# Patient Record
Sex: FEMALE | Employment: OTHER | URBAN - METROPOLITAN AREA
[De-identification: names, ages, dates, MRNs, and addresses within clinical notes are randomized per-mention and may not be internally consistent; named-entity substitution may affect disease eponyms.]

---

## 2020-01-06 ENCOUNTER — RECORDS - HEALTHEAST (OUTPATIENT)
Dept: LAB | Facility: CLINIC | Age: 70
End: 2020-01-06

## 2020-01-06 LAB
ANION GAP SERPL CALCULATED.3IONS-SCNC: 12 MMOL/L (ref 5–18)
BUN SERPL-MCNC: 21 MG/DL (ref 8–22)
CALCIUM SERPL-MCNC: 10.3 MG/DL (ref 8.5–10.5)
CHLORIDE BLD-SCNC: 100 MMOL/L (ref 98–107)
CHOLEST SERPL-MCNC: 210 MG/DL
CO2 SERPL-SCNC: 26 MMOL/L (ref 22–31)
CREAT SERPL-MCNC: 0.8 MG/DL (ref 0.6–1.1)
FASTING STATUS PATIENT QL REPORTED: YES
GFR SERPL CREATININE-BSD FRML MDRD: >60 ML/MIN/1.73M2
GLUCOSE BLD-MCNC: 122 MG/DL (ref 70–125)
HDLC SERPL-MCNC: 72 MG/DL
LDLC SERPL CALC-MCNC: 115 MG/DL
POTASSIUM BLD-SCNC: 4.5 MMOL/L (ref 3.5–5)
SODIUM SERPL-SCNC: 138 MMOL/L (ref 136–145)
TRIGL SERPL-MCNC: 113 MG/DL

## 2020-10-12 ENCOUNTER — RECORDS - HEALTHEAST (OUTPATIENT)
Dept: LAB | Facility: CLINIC | Age: 70
End: 2020-10-12

## 2020-10-12 LAB
ALBUMIN SERPL-MCNC: 3.8 G/DL (ref 3.5–5)
ALP SERPL-CCNC: 77 U/L (ref 45–120)
ALT SERPL W P-5'-P-CCNC: 21 U/L (ref 0–45)
ANION GAP SERPL CALCULATED.3IONS-SCNC: 12 MMOL/L (ref 5–18)
AST SERPL W P-5'-P-CCNC: 18 U/L (ref 0–40)
BILIRUB SERPL-MCNC: 0.4 MG/DL (ref 0–1)
BUN SERPL-MCNC: 17 MG/DL (ref 8–22)
CALCIUM SERPL-MCNC: 10.8 MG/DL (ref 8.5–10.5)
CHLORIDE BLD-SCNC: 100 MMOL/L (ref 98–107)
CO2 SERPL-SCNC: 28 MMOL/L (ref 22–31)
CREAT SERPL-MCNC: 0.79 MG/DL (ref 0.6–1.1)
GFR SERPL CREATININE-BSD FRML MDRD: >60 ML/MIN/1.73M2
GLUCOSE BLD-MCNC: 119 MG/DL (ref 70–125)
POTASSIUM BLD-SCNC: 4.1 MMOL/L (ref 3.5–5)
PROT SERPL-MCNC: 7.4 G/DL (ref 6–8)
SODIUM SERPL-SCNC: 140 MMOL/L (ref 136–145)

## 2020-10-13 ENCOUNTER — RECORDS - HEALTHEAST (OUTPATIENT)
Dept: LAB | Facility: CLINIC | Age: 70
End: 2020-10-13

## 2020-10-14 LAB
O+P STL MICRO: NORMAL
SHIGA TOXIN 1: NEGATIVE
SHIGA TOXIN 2: NEGATIVE
WBC STL QL MICRO: ABNORMAL

## 2020-10-15 LAB
GLIADIN IGA SER-ACNC: 1.8 U/ML
GLIADIN IGG SER-ACNC: <0.4 U/ML
IGA SERPL-MCNC: 1463 MG/DL (ref 65–400)
TTG IGA SER-ACNC: 2 U/ML
TTG IGG SER-ACNC: <0.6 U/ML

## 2020-10-16 LAB — BACTERIA SPEC CULT: NORMAL

## 2020-12-02 ENCOUNTER — RECORDS - HEALTHEAST (OUTPATIENT)
Dept: LAB | Facility: CLINIC | Age: 70
End: 2020-12-02

## 2020-12-02 LAB — POTASSIUM BLD-SCNC: 4.4 MMOL/L (ref 3.5–5)

## 2020-12-07 ENCOUNTER — RECORDS - HEALTHEAST (OUTPATIENT)
Dept: ADMINISTRATIVE | Facility: OTHER | Age: 70
End: 2020-12-07

## 2020-12-14 ENCOUNTER — RECORDS - HEALTHEAST (OUTPATIENT)
Dept: ADMINISTRATIVE | Facility: OTHER | Age: 70
End: 2020-12-14

## 2020-12-14 LAB
LAB AP CHARGES (HE HISTORICAL CONVERSION): NORMAL
PATH REPORT.COMMENTS IMP SPEC: NORMAL
PATH REPORT.FINAL DX SPEC: NORMAL
PATH REPORT.GROSS SPEC: NORMAL
PATH REPORT.MICROSCOPIC SPEC OTHER STN: NORMAL
PATH REPORT.RELEVANT HX SPEC: NORMAL
RESULT FLAG (HE HISTORICAL CONVERSION): NORMAL

## 2020-12-23 ENCOUNTER — RECORDS - HEALTHEAST (OUTPATIENT)
Dept: LAB | Facility: CLINIC | Age: 70
End: 2020-12-23

## 2020-12-23 LAB — TSH SERPL DL<=0.005 MIU/L-ACNC: 1.68 UIU/ML (ref 0.3–5)

## 2022-02-09 ENCOUNTER — LAB REQUISITION (OUTPATIENT)
Dept: LAB | Facility: CLINIC | Age: 72
End: 2022-02-09
Payer: COMMERCIAL

## 2022-02-09 DIAGNOSIS — E78.2 MIXED HYPERLIPIDEMIA: ICD-10-CM

## 2022-02-09 DIAGNOSIS — R73.03 PREDIABETES: ICD-10-CM

## 2022-02-09 LAB
ANION GAP SERPL CALCULATED.3IONS-SCNC: 10 MMOL/L (ref 5–18)
BUN SERPL-MCNC: 18 MG/DL (ref 8–22)
CALCIUM SERPL-MCNC: 10.3 MG/DL (ref 8.5–10.5)
CHLORIDE BLD-SCNC: 101 MMOL/L (ref 98–107)
CHOLEST SERPL-MCNC: 220 MG/DL
CO2 SERPL-SCNC: 29 MMOL/L (ref 22–31)
CREAT SERPL-MCNC: 0.81 MG/DL (ref 0.6–1.1)
GFR SERPL CREATININE-BSD FRML MDRD: 78 ML/MIN/1.73M2
GLUCOSE BLD-MCNC: 143 MG/DL (ref 70–125)
HDLC SERPL-MCNC: 53 MG/DL
LDLC SERPL CALC-MCNC: 135 MG/DL
POTASSIUM BLD-SCNC: 4.2 MMOL/L (ref 3.5–5)
SODIUM SERPL-SCNC: 140 MMOL/L (ref 136–145)
TRIGL SERPL-MCNC: 158 MG/DL

## 2022-02-09 PROCEDURE — 80048 BASIC METABOLIC PNL TOTAL CA: CPT | Performed by: FAMILY MEDICINE

## 2022-02-09 PROCEDURE — 80061 LIPID PANEL: CPT | Mod: ORL | Performed by: FAMILY MEDICINE

## 2023-04-13 NOTE — PROGRESS NOTES
Medication Therapy Management (MTM) Encounter    ASSESSMENT:                            Medication Adherence/Access: No issues identified    Type 2 Diabetes: Patient is not meeting A1c goal of < 7%. Needs further education on Ozempic injection and clarification on cost through pharmacy.    Chronic pain: Plan in place with ortho.    Tremor: Primidone has not been helping, plan in place for neurology follow-up    Afib/Hypertension: Stable.    Hyperlipidemia: Stable. Continue to monitor for improvement with anticipated weight loss and diet changes.      PLAN:                            1. We started you on the Ozempic 0.25mg today in clinic. Plan to take your weekly dose every Saturday with Lei. You will take 0.25mg once a week for 4 weeks, then increase to 0.5mg. There are TWO 0.5mg doses in your current pen. This medication helps your insulin to stay around longer to do its job, decreases appetite, and slows how you absorb sugar into your blood.  It is generally very effective in lowering blood sugar and helping with weight loss. There are some heart and kidney protective benefits long-term. Side effects we monitor for include nausea, vomiting, bloating, constipation or diarrhea, and abdominal pain.    2. When you are due to refill, we confirmed with [facility], that they will have the Ozempic 0.5mg available for you at $47/month.    Follow-up: Friday, May 12th at 10am    SUBJECTIVE/OBJECTIVE:                          Lesly Lazaro is a 73 year old female coming in for an initial visit. She was referred to me from Dr. Franks.      Reason for visit: Medication Review.    Allergies/ADRs: Tetanus, Procaine  Past Medical History: Reviewed in chart  Tobacco: She reports that she quit smoking about 2 years ago. Her smoking use included cigarettes. She does not have any smokeless tobacco history on file.  Alcohol: 1-3 beverages / week    Medication Adherence/Access: no issues reported. Has a chart at home to help her  stay compliant    Type 2 Diabetes:   Ozempic 0.25 mg injected once per week - PA was approved. We called pharmacy to see if she could get it filled today. They can fill it, however it was sent over for the old formulation of 2mg/1.5mL. Additionally, the pharmacist told us there was a cost issue with a 6 week supply of medication and that the pharmacy would be out about $80, which they would have to charge to [facility]. If we were able to send over a 30 day supply of medication, her copay would be $47 with no additional dispensing fees.    Medication Hx:  Metformin - severe GI upset, nausea    Blood Sugar Ranges: does not check  Symptoms of low blood sugar? none.   Symptoms of high blood sugar? None, always thirsty    Diet/Exercise: Admits diet hasn't been great since she quit smoking 2 years ago    Aspirin: Taking 81mg daily and denies side effects   Statin: Atorvastatin 80 mg daily  ACEi/ARB: Losartan 25 mg daily  A1c (4/7/2023): 7.0%, Urine Albumin: None on file, new diagnosis    Chronic pain:   Following with ortho for ruptured disks and hx of arthritis. Had an injection last week but it hasn't helped yet.  Gabapentin 300mg three times daily - has been on since about January and hasn't noticed much effect. She is scheduled for a follow-up next week    Tremor:   Primidone - hasn't helped all that much. Is following with neuro and has upcoming follow-up.     Afib/Hypertension:   warfarin 7.5mg per PCP for anticoagulation  Metoprolol succinate 50mg daily  hydrochlorothiazide 25mg daily  Losartan 25mg daily  Patient reports  no current concerns of bruising or bleeding, no other medication side effects  Patient does not self-monitor blood pressure  Last OV BP: 128/78mmHg    Hyperlipidemia:   Atorvastatin 80mg daily  Patient reports no significant myalgias or other side effects.  Recent Labs   Lab Test 02/09/22 01/06/20   CHOL 220* 210*   HDL 53 72   * 115   TRIG 158* 113       Today's Vitals: Wt 187 lb 9.6 oz (85.1  kg)   ----------------      I spent 60 minutes with this patient today. All changes were made via collaborative practice agreement with No primary care provider on file.. A copy of the visit note was provided to the patient's provider(s).    A summary of these recommendations was given to the patient.    Norah Olsen, Pharm.D., [facility]  Medication Therapy Management Pharmacist  [phone]     Medication Therapy Recommendations  Type 2 diabetes mellitus without complication, without long-term current use of insulin (H)    Current Medication: semaglutide (OZEMPIC) 2 MG/3ML pen   Rationale: Untreated condition - Needs additional medication therapy - Indication   Recommendation: Start Medication   Status: Accepted per CPA

## 2023-04-14 ENCOUNTER — OFFICE VISIT (OUTPATIENT)
Dept: PHARMACY | Facility: PHYSICIAN GROUP | Age: 73
End: 2023-04-14
Payer: COMMERCIAL

## 2023-04-14 VITALS — WEIGHT: 187.6 LBS

## 2023-04-14 DIAGNOSIS — E11.9 TYPE 2 DIABETES MELLITUS WITHOUT COMPLICATION, WITHOUT LONG-TERM CURRENT USE OF INSULIN (H): Primary | ICD-10-CM

## 2023-04-14 DIAGNOSIS — I10 HYPERTENSION, ESSENTIAL: ICD-10-CM

## 2023-04-14 DIAGNOSIS — I48.91 ATRIAL FIBRILLATION, UNSPECIFIED TYPE (H): ICD-10-CM

## 2023-04-14 DIAGNOSIS — E78.5 HYPERLIPIDEMIA, UNSPECIFIED HYPERLIPIDEMIA TYPE: ICD-10-CM

## 2023-04-14 DIAGNOSIS — G25.0 ESSENTIAL TREMOR: ICD-10-CM

## 2023-04-14 DIAGNOSIS — R52 PAIN: ICD-10-CM

## 2023-04-14 PROCEDURE — 99605 MTMS BY PHARM NP 15 MIN: CPT | Performed by: PHARMACIST

## 2023-04-14 PROCEDURE — 99607 MTMS BY PHARM ADDL 15 MIN: CPT | Performed by: PHARMACIST

## 2023-04-14 RX ORDER — METOPROLOL SUCCINATE 50 MG/1
50 TABLET, EXTENDED RELEASE ORAL DAILY
COMMUNITY

## 2023-04-14 RX ORDER — LOSARTAN POTASSIUM 25 MG/1
25 TABLET ORAL DAILY
COMMUNITY

## 2023-04-14 RX ORDER — ATORVASTATIN CALCIUM 80 MG/1
80 TABLET, FILM COATED ORAL DAILY
COMMUNITY

## 2023-04-14 RX ORDER — IPRATROPIUM BROMIDE AND ALBUTEROL SULFATE 2.5; .5 MG/3ML; MG/3ML
1 SOLUTION RESPIRATORY (INHALATION) EVERY 6 HOURS PRN
COMMUNITY

## 2023-04-14 RX ORDER — PRIMIDONE 50 MG/1
50 TABLET ORAL 2 TIMES DAILY
COMMUNITY

## 2023-04-14 RX ORDER — WARFARIN SODIUM 7.5 MG/1
7.5 TABLET ORAL
COMMUNITY

## 2023-04-14 RX ORDER — ACETAMINOPHEN 325 MG/1
2 TABLET ORAL EVERY 4 HOURS PRN
COMMUNITY

## 2023-04-14 RX ORDER — GABAPENTIN 300 MG/1
300 CAPSULE ORAL 3 TIMES DAILY
COMMUNITY
End: 2024-01-26

## 2023-04-14 RX ORDER — HYDROCHLOROTHIAZIDE 25 MG/1
25 TABLET ORAL DAILY
COMMUNITY

## 2023-04-14 RX ORDER — LORAZEPAM 0.5 MG/1
0.5 TABLET ORAL 3 TIMES DAILY PRN
COMMUNITY

## 2023-04-14 RX ORDER — VENLAFAXINE HYDROCHLORIDE 150 MG/1
150 CAPSULE, EXTENDED RELEASE ORAL DAILY
COMMUNITY
End: 2023-07-12 | Stop reason: DRUGHIGH

## 2023-04-14 RX ORDER — ASPIRIN 81 MG/1
81 TABLET ORAL DAILY
COMMUNITY

## 2023-04-14 NOTE — LETTER
_  Medication List        Prepared on: Apr 14, 2023     Bring your Medication List when you go to the doctor, hospital, or   emergency room. And, share it with your family or caregivers.     Note any changes to how you take your medications.  Cross out medications when you no longer use them.    Medication How I take it Why I use it Prescriber   acetaminophen (TYLENOL) 325 MG tablet Take 2 tablets by mouth every 4 hours as needed  Pain Dell Franks MD   ALBUTEROL SULFATE HFA IN Inhale 2 Inhalations into the lungs every 4 hours as needed COPD Dell Franks MD   aspirin 81 MG EC tablet Take 81 mg by mouth daily Disease involving Lipid Deposits in the Arteries Dell Franks MD   atorvastatin (LIPITOR) 80 MG tablet Take 80 mg by mouth daily Elevation of Both Cholesterol and Triglycerides in Blood Dell Franks MD   gabapentin (NEURONTIN) 300 MG capsule Take 300 mg by mouth 3 times daily Neuropathic Pain Dell Franks MD   hydrochlorothiazide (HYDRODIURIL) 25 MG tablet Take 25 mg by mouth daily High Blood Pressure Disorder Dell Franks MD   ipratropium - albuterol 0.5 mg/2.5 mg/3 mL (DUONEB) 0.5-2.5 (3) MG/3ML neb solution Take 1 vial by nebulization every 6 hours as needed for shortness of breath COPD Dell Franks MD   LORazepam (ATIVAN) 0.5 MG tablet Take 0.5 mg by mouth 3 times daily as needed for anxiety Anxiety Dell Franks MD   losartan (COZAAR) 25 MG tablet Take 25 mg by mouth daily High Blood Pressure Disorder Dell Franks MD   metoprolol succinate ER (TOPROL XL) 50 MG 24 hr tablet Take 50 mg by mouth daily Atrial Fibrillation; High Blood Pressure Disorder Dell Franks MD   primidone (MYSOLINE) 50 MG tablet Take 100 mg by mouth daily Fine to Coarse Slow Tremor Affecting Head, Hands & Voice Dell Franks MD   semaglutide (OZEMPIC) 2 MG/3ML pen Inject 0.25 mg Subcutaneous every 7 days Type 2 Diabetes Dell Franks MD   venlafaxine (EFFEXOR XR) 150 MG 24 hr capsule Take 150  mg by mouth daily Major Depressive Disorder Dell Franks MD   warfarin ANTICOAGULANT (COUMADIN) 7.5 MG tablet Take 7.5 mg by mouth As directed per PCP Atrial Fibrillation Dell Franks MD         Add new medications, over-the-counter drugs, herbals, vitamins, or  minerals in the blank rows below.    Medication How I take it Why I use it Prescriber                                      Allergies:      No Active Allergies        Side effects I have had:               Other Information:              My notes and questions:

## 2023-04-14 NOTE — LETTER
April 14, 2023  Lesly Lazaro  [address] [place] [address]    Dear Ms. Lazaro, [facility]     Thank you for talking with me on Apr 14, 2023 about your health and medications. As a follow-up to our conversation, I have included two documents:      1. Your Recommended To-Do List has steps you should take to get the best results from your medications.  2. Your Medication List will help you keep track of your medications and how to take them.    If you want to talk about these documents, please call Norah Olsen RPH, PharmD, BCACP  at phone: [phone], Monday-Friday 8-4:30pm.    I look forward to working with you and your doctors to make sure your medications work well for you.    Sincerely,  Norah Olsen RPH, PharmD, BCACP   [facility] Pharmacist, [facility]

## 2023-04-14 NOTE — LETTER
"Recommended To-Do List      Prepared on: Apr 14, 2023       You can get the best results from your medications by completing the items on this \"To-Do List.\"      Bring your To-Do List when you go to your doctor. And, share it with your family or caregivers.    My To-Do List:  What we talked about: What I should do:   A new medication that may be of benefit to you    Start taking Ozempic 0.25mg injected once weekly                "

## 2023-05-11 NOTE — PROGRESS NOTES
Medication Therapy Management (MTM) Encounter    ASSESSMENT:                            Medication Adherence/Access: No issues identified    Type 2 Diabetes: Patient is not meeting A1c goal of < 7%.  She would benefit from increase in Ozempic dose for further blood sugar lowering, weight loss, cardiorenal benefits.        PLAN:                            1.  Starting with your shot next week (May 20th), increase Ozempic to 0.5mg. You will want to call the pharmacy and request a fill of Ozempic. Continue taking 0.5mg injected every Saturday. We'll plan to have you on 0.5mg for about 5 weeks.      Follow-up:  Future Appointments 5/12/2023 - 11/8/2023      Date Visit Type Length Department Provider     6/16/2023 10:30 AM RETURN - MT 30 min ETR  [place] [facility]  EXT Norah Olsen RPH                     SUBJECTIVE/OBJECTIVE:                          Lesly Lazaro is a 73 year old female coming in for an follow-up visit from 4/14/2023. She was referred to me from Dr. Franks.      Reason for visit: Ozempic titration    Allergies/ADRs: Tetanus, Procaine  Past Medical History: Reviewed in chart  Tobacco: She reports that she quit smoking about 2 years ago. Her smoking use included cigarettes. She does not have any smokeless tobacco history on file.  Alcohol: 1-3 beverages / week    Medication Adherence/Access: no issues reported. Has a chart at home to help her stay compliant    Type 2 Diabetes:   Ozempic 0.25 mg injected once per week - tolerating without side effects, notices a slight appetite reduction. Took her 5th shot of 0.25mg this morning, usually takes on Saturdays but going out of town this weekend.    Medication Hx:  Metformin - severe GI upset, nausea    Blood Sugar Ranges: does not check  Symptoms of low blood sugar? none.   Symptoms of high blood sugar? None reported    Diet/Exercise: Admits diet hasn't been great since she quit smoking 2 years ago    Aspirin: Taking 81mg daily and denies side effects    Statin: Atorvastatin 80 mg daily  ACEi/ARB: Losartan 25 mg daily  A1c (4/7/2023): 7.0%, Urine Albumin: None on file, new diagnosis        Today's Vitals: /68   Pulse 80   Wt 183 lb 12.8 oz (83.4 kg)   ----------------      I spent 60 minutes with this patient today. All changes were made via collaborative practice agreement with No primary care provider on file.. A copy of the visit note was provided to the patient's provider(s).    A summary of these recommendations was given to the patient.    Norah Olsen, Pharm.D., [facility]  Medication Therapy Management Pharmacist  [phone]     Medication Therapy Recommendations  Type 2 diabetes mellitus without complication, without long-term current use of insulin (H)    Current Medication: semaglutide (OZEMPIC) 2 MG/3ML pen   Rationale: Dose too low - Dosage too low - Effectiveness   Recommendation: Increase Dose   Status: Accepted per CPA

## 2023-05-12 ENCOUNTER — OFFICE VISIT (OUTPATIENT)
Dept: PHARMACY | Facility: PHYSICIAN GROUP | Age: 73
End: 2023-05-12
Payer: COMMERCIAL

## 2023-05-12 VITALS — SYSTOLIC BLOOD PRESSURE: 108 MMHG | HEART RATE: 80 BPM | WEIGHT: 183.8 LBS | DIASTOLIC BLOOD PRESSURE: 68 MMHG

## 2023-05-12 DIAGNOSIS — E11.9 TYPE 2 DIABETES MELLITUS WITHOUT COMPLICATION, WITHOUT LONG-TERM CURRENT USE OF INSULIN (H): Primary | ICD-10-CM

## 2023-05-12 PROCEDURE — 99607 MTMS BY PHARM ADDL 15 MIN: CPT | Performed by: PHARMACIST

## 2023-05-12 PROCEDURE — 99606 MTMS BY PHARM EST 15 MIN: CPT | Performed by: PHARMACIST

## 2023-05-12 NOTE — PATIENT INSTRUCTIONS
Recommendations from today's MTM visit:                                                      1.  Starting with your shot next week (May 20th), increase Ozempic to 0.5mg. You will want to call the pharmacy and request a fill of Ozempic. Continue taking 0.5mg injected every Saturday. We'll plan to have you on 0.5mg for about 5 weeks.      Follow-up:  Future Appointments 5/12/2023 - 11/8/2023        Date Visit Type Length Department Provider     6/16/2023 10:30 AM RETURN - MTM 30 min ETR  [facility]  EXT [place], Norah Bloom, [facility]                       It was great to speak with you today.  I value your experience and would be very thankful for your time with providing feedback on our clinic survey. You may receive a survey via email or text message in the next few days.     To schedule another MTM appointment, please call the clinic directly or you may call the MTM scheduling line at [phone] or toll-free at [phone].     My Clinical Pharmacist's contact information:                                                      Please feel free to contact me with any questions or concerns you have.      Norah Olsen, Pharm.D., BCACP  Medication Therapy Management Pharmacist

## 2023-06-15 NOTE — PROGRESS NOTES
Medication Therapy Management (MTM) Encounter    ASSESSMENT:                            Medication Adherence/Access: No issues identified    Type 2 Diabetes: Patient is not meeting A1c goal of < 7%, due for recheck next month.  She would benefit from increase in Ozempic dose for further blood sugar lowering, weight loss, cardiorenal benefits.      Chronic pain: Not well controlled, plan in place with ortho.      PLAN:                            1.  Increase your Ozempic to 1mg injected once per week, when/if your current supply of 0.5mg is gone.    2. At our next visit, we'll have you get a blood draw for the A1c and urine sample for the kidneys.      Follow-up:  Future Appointments 6/16/2023 - 12/13/2023      Date Visit Type Length Department Provider     7/12/2023  9:00 AM RETURN - MTM 30 min ETR  [place]  EXT Norah Olsen, [facility]                     SUBJECTIVE/OBJECTIVE:                          Lesly Lazaro is a 73 year old female coming in for an follow-up visit from 5/12/2023. She was referred to me from Dr. Franks.      Reason for visit: Ozempic titration    Allergies/ADRs: Tetanus, Procaine  Past Medical History: Reviewed in chart  Tobacco: She reports that she quit smoking about 2 years ago. Her smoking use included cigarettes. She does not have any smokeless tobacco history on file.  Alcohol: 1-3 beverages / week    Medication Adherence/Access: no issues reported. Has a chart at home to help her stay compliant    Type 2 Diabetes:   Ozempic 0.5 mg injected once per week - increased last visit, tolerating without side effects, notices a appetite reduction.  Medication Hx:  Metformin - severe GI upset, nausea    Blood Sugar Ranges: does not check  Symptoms of low blood sugar? none.   Symptoms of high blood sugar? None reported    Diet/Exercise: Admits diet hasn't been great since she quit smoking 2 years ago  Aspirin: Taking 81mg daily and denies side effects   Statin: Atorvastatin 80 mg  daily  ACEi/ARB: Losartan 25 mg daily  A1c (4/7/2023): 7.0%, Urine Albumin: None on file, new diagnosis    Chronic pain:   Following with ortho for ruptured disks and hx of arthritis. Has had several injections over the last few months, which haven't helped  Gabapentin 300mg three times daily - increased to four times daily at most recent ortho visit, but it hasn't helped  She has upcoming follow-up with surgeon to evaluate for surgical treatment.has been on since about January and hasn't noticed much effect. She is scheduled for a follow-up next week    Today's Vitals: /78   Wt 179 lb (81.2 kg)   ----------------      I spent 30 minutes with this patient today. All changes were made via collaborative practice agreement with No primary care provider on file.. A copy of the visit note was provided to the patient's provider(s).    A summary of these recommendations was given to the patient.    Norah Olsen, Pharm.D., [facility]  Medication Therapy Management Pharmacist  [phone]     Medication Therapy Recommendations  Type 2 diabetes mellitus without complication, without long-term current use of insulin (H)    Current Medication: semaglutide (OZEMPIC) 2 MG/3ML pen (Discontinued)   Rationale: Dose too low - Dosage too low - Effectiveness   Recommendation: Increase Dose - Ozempic (1 MG/DOSE) 4 MG/3ML Sopn   Status: Accepted per CPA

## 2023-06-16 ENCOUNTER — OFFICE VISIT (OUTPATIENT)
Dept: PHARMACY | Facility: PHYSICIAN GROUP | Age: 73
End: 2023-06-16
Payer: COMMERCIAL

## 2023-06-16 VITALS — WEIGHT: 179 LBS | DIASTOLIC BLOOD PRESSURE: 78 MMHG | SYSTOLIC BLOOD PRESSURE: 108 MMHG

## 2023-06-16 DIAGNOSIS — R52 PAIN: ICD-10-CM

## 2023-06-16 DIAGNOSIS — E11.9 TYPE 2 DIABETES MELLITUS WITHOUT COMPLICATION, WITHOUT LONG-TERM CURRENT USE OF INSULIN (H): Primary | ICD-10-CM

## 2023-06-16 PROCEDURE — 99606 MTMS BY PHARM EST 15 MIN: CPT | Performed by: PHARMACIST

## 2023-06-16 PROCEDURE — 99607 MTMS BY PHARM ADDL 15 MIN: CPT | Performed by: PHARMACIST

## 2023-06-16 RX ORDER — SEMAGLUTIDE 1.34 MG/ML
1 INJECTION, SOLUTION SUBCUTANEOUS
COMMUNITY
End: 2023-07-12 | Stop reason: DRUGHIGH

## 2023-06-16 NOTE — PATIENT INSTRUCTIONS
Recommendations from today's MTM visit:                                                        1.  Increase your Ozempic to 1mg injected once per week, when/if your current supply of 0.5mg is gone.    2. At our next visit, we'll have you get a blood draw for the A1c and urine sample for the kidneys.      Follow-up:  Future Appointments 6/16/2023 - 12/13/2023        Date Visit Type Length Department Provider     7/12/2023  9:00 AM RETURN - [facility] 30 min ETR  [facility]  EXT Norah Olsen, [facility]                     It was great to speak with you today.  I value your experience and would be very thankful for your time with providing feedback on our clinic survey. You may receive a survey via email or text message in the next few days.     To schedule another MTM appointment, please call the clinic directly or you may call the MTM scheduling line at [phone]    My Clinical Pharmacist's contact information:                                                      Please feel free to contact me with any questions or concerns you have.      Norah Olsen, Pharm.D., BCACP  Medication Therapy Management Pharmacist

## 2023-07-12 ENCOUNTER — TRANSFERRED RECORDS (OUTPATIENT)
Dept: HEALTH INFORMATION MANAGEMENT | Facility: CLINIC | Age: 73
End: 2023-07-12

## 2023-07-12 ENCOUNTER — LAB REQUISITION (OUTPATIENT)
Dept: LAB | Facility: CLINIC | Age: 73
End: 2023-07-12
Payer: COMMERCIAL

## 2023-07-12 ENCOUNTER — OFFICE VISIT (OUTPATIENT)
Dept: PHARMACY | Facility: PHYSICIAN GROUP | Age: 73
End: 2023-07-12
Payer: COMMERCIAL

## 2023-07-12 VITALS — WEIGHT: 176.2 LBS

## 2023-07-12 DIAGNOSIS — E11.9 TYPE 2 DIABETES MELLITUS WITHOUT COMPLICATIONS (H): ICD-10-CM

## 2023-07-12 DIAGNOSIS — F41.9 ANXIETY: ICD-10-CM

## 2023-07-12 DIAGNOSIS — G25.0 ESSENTIAL TREMOR: ICD-10-CM

## 2023-07-12 DIAGNOSIS — E11.9 TYPE 2 DIABETES MELLITUS WITHOUT COMPLICATION, WITHOUT LONG-TERM CURRENT USE OF INSULIN (H): Primary | ICD-10-CM

## 2023-07-12 DIAGNOSIS — E78.2 MIXED HYPERLIPIDEMIA: ICD-10-CM

## 2023-07-12 DIAGNOSIS — F32.A DEPRESSION, UNSPECIFIED DEPRESSION TYPE: ICD-10-CM

## 2023-07-12 LAB
ANION GAP SERPL CALCULATED.3IONS-SCNC: 13 MMOL/L (ref 7–15)
BUN SERPL-MCNC: 14.6 MG/DL (ref 8–23)
CALCIUM SERPL-MCNC: 10.1 MG/DL (ref 8.8–10.2)
CHLORIDE SERPL-SCNC: 99 MMOL/L (ref 98–107)
CHOLEST SERPL-MCNC: 193 MG/DL
CREAT SERPL-MCNC: 0.95 MG/DL (ref 0.51–0.95)
CREAT UR-MCNC: 371 MG/DL
DEPRECATED HCO3 PLAS-SCNC: 29 MMOL/L (ref 22–29)
GFR SERPL CREATININE-BSD FRML MDRD: 64 ML/MIN/1.73M2
GLUCOSE SERPL-MCNC: 110 MG/DL (ref 70–99)
HBA1C MFR BLD: 6.1 % (ref 4.2–6.1)
HDLC SERPL-MCNC: 64 MG/DL
LDLC SERPL CALC-MCNC: 107 MG/DL
MICROALBUMIN UR-MCNC: 19.4 MG/L
MICROALBUMIN/CREAT UR: 5.23 MG/G CR (ref 0–25)
NONHDLC SERPL-MCNC: 129 MG/DL
POTASSIUM SERPL-SCNC: 4.2 MMOL/L (ref 3.4–5.3)
SODIUM SERPL-SCNC: 141 MMOL/L (ref 136–145)
TRIGL SERPL-MCNC: 112 MG/DL

## 2023-07-12 PROCEDURE — 82570 ASSAY OF URINE CREATININE: CPT | Mod: ORL | Performed by: FAMILY MEDICINE

## 2023-07-12 PROCEDURE — 99607 MTMS BY PHARM ADDL 15 MIN: CPT | Performed by: PHARMACIST

## 2023-07-12 PROCEDURE — 80048 BASIC METABOLIC PNL TOTAL CA: CPT | Mod: ORL | Performed by: FAMILY MEDICINE

## 2023-07-12 PROCEDURE — 99606 MTMS BY PHARM EST 15 MIN: CPT | Performed by: PHARMACIST

## 2023-07-12 PROCEDURE — 80061 LIPID PANEL: CPT | Mod: ORL | Performed by: FAMILY MEDICINE

## 2023-07-12 RX ORDER — VENLAFAXINE HYDROCHLORIDE 37.5 MG/1
37.5 CAPSULE, EXTENDED RELEASE ORAL DAILY
COMMUNITY
End: 2023-11-08

## 2023-07-12 RX ORDER — VENLAFAXINE HYDROCHLORIDE 75 MG/1
75 CAPSULE, EXTENDED RELEASE ORAL DAILY
COMMUNITY
End: 2023-08-09

## 2023-07-12 RX ORDER — VENLAFAXINE 75 MG/1
75 TABLET ORAL DAILY
COMMUNITY
End: 2023-07-12

## 2023-07-12 NOTE — PROGRESS NOTES
Medication Therapy Management (MTM) Encounter    ASSESSMENT:                            Medication Adherence/Access: No issues identified    Type 2 Diabetes: Patient is meeting A1c goal of < 7% as of recheck today!  She would benefit from increase in Ozempic dose for further blood sugar lowering, weight loss, cardiorenal benefits.      Tremor: Plan in place with neurology.  May also see reduction in tremor with discontinuation of venlafaxine.    Depression/anxiety: Stable.  She may benefit from tapering off of venlafaxine as treatment may no longer be indicated.      PLAN:                            1.  Increase your Ozempic to 2mg injected once per week. This is the maximum dose to help optimize weight loss and long term heart and kidney protection. A1c is improved form 7.0% to 6.1%!!    2. We are going to wean off of venlafaxine over the next month. Reduce to 75mg once daily for two weeks, then 37.5mg once daily for two weeks, then stop altogether. I sent prescriptions for the lower doses to the pharmacy. Pay attention to mood and anxiety levels after you have been off of venlafaxine for a few weeks/months and also note if tremor improves.    Follow-up:  Aug 09, 2023 10:30 AM  Pharmacist Visit with Norah Olsen [facility] ([facility] ) [phone]          SUBJECTIVE/OBJECTIVE:                          Lesly Lazaro is a 73 year old female coming in for an follow-up visit from 5/12/2023. She was referred to me from Dr. Franks.      Reason for visit: Ozempic titration. Asks about getting off of venlafaxine    Allergies/ADRs: Tetanus, Procaine  Past Medical History: Reviewed in chart  Tobacco: She reports that she quit smoking about 2 years ago. Her smoking use included cigarettes. She does not have any smokeless tobacco history on file.  Alcohol: 1-3 beverages / week    Medication Adherence/Access: no issues reported. Has a chart at home to  help her stay compliant    Type 2 Diabetes:   Ozempic 1 mg injected once per week - increased last visit, tolerating without side effects, notices appetite reduction.  Medication Hx:  Metformin - severe GI upset, nausea    Blood Sugar Ranges: does not check  Symptoms of low blood sugar? none.   Symptoms of high blood sugar? None reported  Diet/Exercise: Admits diet hasn't been great since she quit smoking 2 years ago  Urine Albumin: labs pending from today  A1c   (4/7/2023): 7.0%  (7/12/23): 6.1%    Tremor:   Primidone 50mg 2 tablets BID - dose increased from 1 tab twice daily at recent neurology visit 6/30/23. Hasn't started this dose yet, was waiting to hear if liver function labs were normal.      Depression/Anxiety:  Venlafaxine XR 150mg once daily  Lorazepam 0.5mg prn - takes one tablet every few months prior to travel - air or driving to a new place.   Patient is experiencing the following side effects: tremor, being treated as above but also concerned that this could be a side effect from venlafaxine  Patient reports that depression symptoms are stable and have been for some time.  Reports she started on antidepressants in 1996 during a divorce.  Now questions if she needs any medication for depression or anxiety, as she is in a good place with strong family support.      Today's Vitals: Wt 176 lb 3.2 oz (79.9 kg)   ----------------      I spent 30 minutes with this patient today. All changes were made via collaborative practice agreement with No primary care provider on file.. A copy of the visit note was provided to the patient's provider(s).    A summary of these recommendations was given to the patient.    Norah Olsen, Pharm.D., [facility]  Medication Therapy Management Pharmacist  [phone]     Medication Therapy Recommendations  Depression, unspecified depression type    Current Medication: venlafaxine (EFFEXOR XR) 150 MG 24 hr capsule (Discontinued)   Rationale: No medical indication at this  time - Unnecessary medication therapy - Indication   Recommendation: Discontinue Medication - venlafaxine 75 MG 24 hr capsule   Status: Accepted per CPA         Type 2 diabetes mellitus without complication, without long-term current use of insulin (H)    Current Medication: Semaglutide, 1 MG/DOSE, (OZEMPIC, 1 MG/DOSE,) 4 MG/3ML pen (Discontinued)   Rationale: Dose too low - Dosage too low - Effectiveness   Recommendation: Increase Dose - Ozempic (2 MG/DOSE) 8 MG/3ML Sopn   Status: Accepted per CPA

## 2023-07-12 NOTE — PATIENT INSTRUCTIONS
Recommendations from today's MTM visit:                                                      1.  Increase your Ozempic to 2mg injected once per week. This is the maximum dose to help optimize weight loss and long term heart and kidney protection. A1c is improved form 7.0% to 6.1%!!    2. We are going to wean off of venlafaxine over the next month. Reduce to 75mg once daily for two weeks, then 37.5mg once daily for two weeks, then stop altogether. I sent prescriptions for the lower doses to the pharmacy. Pay attention to mood and anxiety levels after you have been off of venlafaxine for a few weeks/months and also note if tremor improves.    Follow-up:  Aug 09, 2023 10:30 AM  Pharmacist Visit with Norah Olsen [facility] ([facility] ) [phone]            It was great to speak with you today.  I value your experience and would be very thankful for your time with providing feedback on our clinic survey. You may receive a survey via email or text message in the next few days.     To schedule another MTM appointment, please call the clinic directly or you may call the MTM scheduling line at [phone]    My Clinical Pharmacist's contact information:                                                      Please feel free to contact me with any questions or concerns you have.      Norah Olsen, Pharm.D., [facility]  Medication Therapy Management Pharmacist

## 2023-08-08 NOTE — PROGRESS NOTES
Medication Therapy Management (MTM) Encounter    ASSESSMENT:                            Medication Adherence/Access: No issues identified    Type 2 Diabetes: Patient is meeting A1c goal of < 7%.    Tremor: Plan in place with neurology.  May also see reduction in tremor with discontinuation of venlafaxine.    Depression/anxiety: Stable. Tolerating venlafaxine wean.       PLAN:                            1.  No changes to medications today.     2. We are going to see if the tremor improves off of the venlafaxine.    3. You may need to check around to different pharmacies to see who has a supply of Ozempic 2mg pens. I don't want you to go off of this completely if there are issues getting your prescription filled. Keep me posted.    Follow-up:  Nov 08, 2023 10:00 AM  Pharmacist Visit with Norah Olsen [facility] ([facility] ) [phone]     Norah Olsen, Pharm.D., [facility]  Medication Therapy Management Pharmacist  [phone]        SUBJECTIVE/OBJECTIVE:                          Lesly Lazaro is a 73 year old female coming in for an follow-up visit from 7/12/2023. She was referred to me from Dr. Franks.      Reason for visit: Ozempic titration. Asks about getting off of venlafaxine    Allergies/ADRs: Tetanus, Procaine  Past Medical History: Reviewed in chart  Tobacco: She reports that she quit smoking about 2 years ago. Her smoking use included cigarettes. She does not have any smokeless tobacco history on file.  Alcohol: 1-3 beverages / week    Medication Adherence/Access: no issues reported. Has a chart at home to help her stay compliant    Type 2 Diabetes:   Ozempic 2mg injected once per week - increased last visit, tolerating without side effects, notices appetite reduction.  Medication Hx:  Metformin - severe GI upset, nausea    Blood Sugar Ranges: does not check  Symptoms of low blood sugar? none.   Symptoms of high blood sugar?  None reported  Diet/Exercise: Admits diet hasn't been great since she quit smoking 2 years ago  Urine Albumin:   Lab Results   Component Value Date    UMALCR 5.23 07/12/2023            Tremor:   Primidone 50mg 2 tablets BID - dose increased from 1 tab twice daily at recent neurology visit 6/30/23. Hasn't started this dose yet; tells me today she is going to wait to increase from 2 tablets daily until fully off of venlafaxine.      Depression/Anxiety:  Venlafaxine XR 37.5 once daily - has 9 days left at home, then will be off; has been weaning off since last visit d/t feeling depression and anxiety symptoms were under good control  Lorazepam 0.5mg prn - takes one tablet every few months prior to travel - air or driving to a new place.   Patient is experiencing the following side effects: tremor, being treated as above but also concerned that this could be a side effect from venlafaxine - hasn't noticed a change to tremor at this point  Patient reports that depression symptoms are stable and have been for some time.  Reports she started on antidepressants in 1996 during a divorce.  Now questions if she needs any medication for depression or anxiety, as she is in a good place with strong family support.      Today's Vitals: Wt 171 lb 12.8 oz (77.9 kg)   ----------------      I spent 30 minutes with this patient today. All changes were made via collaborative practice agreement with No primary care provider on file.. A copy of the visit note was provided to the patient's provider(s).    A summary of these recommendations was given to the patient.    Norah Olsen, Pharm.D., [facility]  Medication Therapy Management Pharmacist  [phone]     Medication Therapy Recommendations  No medication therapy recommendations to display

## 2023-08-09 ENCOUNTER — OFFICE VISIT (OUTPATIENT)
Dept: PHARMACY | Facility: PHYSICIAN GROUP | Age: 73
End: 2023-08-09
Payer: COMMERCIAL

## 2023-08-09 VITALS — WEIGHT: 171.8 LBS

## 2023-08-09 DIAGNOSIS — F41.9 ANXIETY: ICD-10-CM

## 2023-08-09 DIAGNOSIS — F32.A DEPRESSION, UNSPECIFIED DEPRESSION TYPE: ICD-10-CM

## 2023-08-09 DIAGNOSIS — E11.9 TYPE 2 DIABETES MELLITUS WITHOUT COMPLICATION, WITHOUT LONG-TERM CURRENT USE OF INSULIN (H): Primary | ICD-10-CM

## 2023-08-09 DIAGNOSIS — G25.0 ESSENTIAL TREMOR: ICD-10-CM

## 2023-08-09 PROCEDURE — 99607 MTMS BY PHARM ADDL 15 MIN: CPT | Performed by: PHARMACIST

## 2023-08-09 PROCEDURE — 99606 MTMS BY PHARM EST 15 MIN: CPT | Performed by: PHARMACIST

## 2023-08-09 NOTE — PATIENT INSTRUCTIONS
Recommendations from today's MTM visit:                                                      1.  No changes to medications today.     2. We are going to see if the tremor improves off of the venlafaxine.    3. You may need to check around to different pharmacies to see who has a supply of Ozempic 2mg pens. I don't want you to go off of this completely if there are issues getting your prescription filled. Keep me posted.    Follow-up:  Nov 08, 2023 10:00 AM  Pharmacist Visit with Norah Olsen [facility] ([facility] ) [phone]       It was great to speak with you today.  I value your experience and would be very thankful for your time with providing feedback on our clinic survey. You may receive a survey via email or text message in the next few days.     To schedule another MT appointment, please call the clinic directly or you may call the MTM scheduling line at [phone]    My Clinical Pharmacist's contact information:                                                      Please feel free to contact me with any questions or concerns you have.      Norah Olsen, Pharm.D., BCACP  Medication Therapy Management Pharmacist

## 2023-08-11 ENCOUNTER — TRANSFERRED RECORDS (OUTPATIENT)
Dept: HEALTH INFORMATION MANAGEMENT | Facility: CLINIC | Age: 73
End: 2023-08-11
Payer: COMMERCIAL

## 2023-09-29 ENCOUNTER — LAB REQUISITION (OUTPATIENT)
Dept: LAB | Facility: CLINIC | Age: 73
End: 2023-09-29
Payer: COMMERCIAL

## 2023-09-29 DIAGNOSIS — E04.1 NONTOXIC SINGLE THYROID NODULE: ICD-10-CM

## 2023-09-29 LAB — TSH SERPL DL<=0.005 MIU/L-ACNC: 1.01 UIU/ML (ref 0.3–4.2)

## 2023-09-29 PROCEDURE — 86376 MICROSOMAL ANTIBODY EACH: CPT | Mod: ORL | Performed by: FAMILY MEDICINE

## 2023-09-29 PROCEDURE — 84443 ASSAY THYROID STIM HORMONE: CPT | Mod: ORL | Performed by: FAMILY MEDICINE

## 2023-10-02 LAB — THYROPEROXIDASE AB SERPL-ACNC: <10 IU/ML

## 2023-11-08 ENCOUNTER — OFFICE VISIT (OUTPATIENT)
Dept: PHARMACY | Facility: PHYSICIAN GROUP | Age: 73
End: 2023-11-08
Payer: COMMERCIAL

## 2023-11-08 DIAGNOSIS — E11.9 TYPE 2 DIABETES MELLITUS WITHOUT COMPLICATION, WITHOUT LONG-TERM CURRENT USE OF INSULIN (H): Primary | ICD-10-CM

## 2023-11-08 DIAGNOSIS — F41.9 ANXIETY: ICD-10-CM

## 2023-11-08 DIAGNOSIS — G25.0 ESSENTIAL TREMOR: ICD-10-CM

## 2023-11-08 DIAGNOSIS — R52 PAIN: ICD-10-CM

## 2023-11-08 DIAGNOSIS — M81.0 AGE-RELATED OSTEOPOROSIS WITHOUT CURRENT PATHOLOGICAL FRACTURE: ICD-10-CM

## 2023-11-08 DIAGNOSIS — F32.A DEPRESSION, UNSPECIFIED DEPRESSION TYPE: ICD-10-CM

## 2023-11-08 PROCEDURE — 99607 MTMS BY PHARM ADDL 15 MIN: CPT | Performed by: PHARMACIST

## 2023-11-08 PROCEDURE — 99606 MTMS BY PHARM EST 15 MIN: CPT | Performed by: PHARMACIST

## 2023-11-08 RX ORDER — SEMAGLUTIDE 1.34 MG/ML
1 INJECTION, SOLUTION SUBCUTANEOUS
COMMUNITY

## 2023-11-08 NOTE — PROGRESS NOTES
Medication Therapy Management (MTM) Encounter    ASSESSMENT:                            Medication Adherence/Access: No issues identified    Diabetes:   Stable. Patient is meeting A1c goal of < 7%.    Osteoporosis:   Stable. Patient would benefit from initiation of calcium and vitamin D supplement.    Tremor:   Stable.  Plan in place with neurology    Depression/Anxiety:  Stable off of medication.    Back pain:  Resolved postop.  Gabapentin may be contributing to daytime fatigue and she may benefit from beginning to taper off and monitoring pain levels.  Could also consider checking vitamin D levels and B12 levels for further assessment.      PLAN:                            1.  There is a good chance the gabapentin is making you drowsy during the day. Since the pain levels have improved, talk with ortho about further reducing and/or getting off of the gabapentin. You could reduce to twice a day for a week or two, then once a day for a week or two, then off.     2. We will check your Vitamin D and B12 levels next Friday to see if these are low and also contributing to low energy levels.    3. Start a calcium +Vitamin D 600mg/400IU twice a day for bone health and to suppport the new bone medicine you are taking.      Follow-up:  Lab on Friday, November 17th at 8:45am  I will call on Tuesday, November 21st around 12:30pm to review the results      Norah Olsen, Pharm.D., [facility]  Medication Therapy Management Pharmacist  [phone]        SUBJECTIVE/OBJECTIVE:                          Lesly Lazaro is a 73 year old female coming in for an follow-up visit from 8/9/2023.      Reason for visit: Med Review.  Fatigue, feeling a little more tired in the last few weeks/months. Just has major surgery: [facility] 10/10 to 10/13/23 s/p L5-S1 decompression and fusion.  Has follow-up with ortho next week    Allergies/ADRs: Tetanus, Procaine  Past Medical History: Reviewed in chart  Tobacco: She reports that she quit  "smoking about 2 years ago. Her smoking use included cigarettes. She has never used smokeless tobacco.  Alcohol: 1-3 beverages / week    Medication Adherence/Access: no issues reported. Has a chart at home to help her stay compliant    Type 2 Diabetes:   Ozempic 1mg injected once per week - she reduced from 2mg due to significant nausea at 10/23/2023 visit with Dr. Franks  Medication Hx:  Metformin - severe GI upset, nausea    Blood Sugar Ranges: checks once per week; 142mg/dL fasting on Tuesday, typically 120-140mg/dL  Symptoms of low blood sugar? none.   Symptoms of high blood sugar? None reported  Diet/Exercise: Admits diet hasn't been great since she quit smoking 2 years ago  Stopped baking a few years ago  Got rid of the extra [employer] candy - Lei took it to work  Urine Albumin:   Lab Results   Component Value Date    UMALCR 5.23 07/12/2023            Osteoporosis:   ibandronate (Boniva) 150mg monthly (has been on current therapy 1 week - started 11/1/23)  Patient is not experiencing side effects.  DEXA History: 6/29/2020 showed osteoporosis of the lumbar spine, right hip and osteopenia of the left hip  Risk factors: post-menopausal  Last vitamin D level:  No results found for: \"VITDT\"    Tremor:   Primidone 50mg 2 tablets BID - dose increased from 1 tab twice daily at neurology visit 6/30/23. Still has not started this dose yet  Reports today she has good days and bad days; maybe a little better since stopping venlafaxine    Depression/Anxiety:  Lorazepam 0.5mg prn - takes one tablet every few months prior to travel - air or driving to a new place.   Medication History:  Venlafaxine - tapered off in the last few months    Back pain:  Gone following surgery  Gabapentin 300mg four times daily - has reduced on her own to three times daily, she cut out the bedtime dose.  As above she notes feeling tired during the day for the last few weeks or months.  She often feels sleepy while sitting or at rest and even in " conversation with others.    Today's Vitals: There were no vitals taken for this visit.  ----------------      I spent 30 minutes with this patient today. All changes were made via collaborative practice agreement with No primary care provider on file.. A copy of the visit note was provided to the patient's provider(s).    A summary of these recommendations was given to the patient.    Norah Olsen, Pharm.D., [facility]  Medication Therapy Management Pharmacist  [phone]     Medication Therapy Recommendations  Age-related osteoporosis without current pathological fracture    Rationale: Synergistic therapy - Needs additional medication therapy - Indication   Recommendation: Calcium 600+D Plus Minerals 600-400 MG-UNIT Tabs   Status: Accepted - no CPA Needed         Pain    Current Medication: gabapentin (NEURONTIN) 300 MG capsule   Rationale: No medical indication at this time - Unnecessary medication therapy - Indication   Recommendation: Decrease Dose   Status: Accepted per CPA

## 2023-11-08 NOTE — PATIENT INSTRUCTIONS
Recommendations from today's MTM visit:                                                      1.  There is a good chance the gabapentin is making you drowsy during the day. Since the pain levels have improved, talk with ortho about further reducing and/or getting off of the gabapentin. You could reduce to twice a day for a week or two, then once a day for a week or two, then off.     2. We will check your Vitamin D and B12 levels next Friday to see if these are low and also contributing to low energy levels.    3. Start a calcium +Vitamin D 600mg/400IU twice a day for bone health and to suppport the new bone medicine you are taking.      Follow-up:  Lab on Friday, November 17th at 8:45am  I will call on Tuesday, November 21st around 12:30pm to review the results      Norah Olsen Pharm.D., BCACP  Medication Therapy Management Pharmacist  [phone]    It was great to speak with you today.  I value your experience and would be very thankful for your time with providing feedback on our clinic survey. You may receive a survey via email or text message in the next few days.     To schedule another MTM appointment, please call the clinic directly or you may call the scheduling line at [phone].    My Clinical Pharmacist's contact information:                                                      Please feel free to contact me with any questions or concerns you have.      Norah Olsen Pharm.D., BCACP  Medication Therapy Management Pharmacist  [phone]

## 2023-11-17 ENCOUNTER — LAB REQUISITION (OUTPATIENT)
Dept: LAB | Facility: CLINIC | Age: 73
End: 2023-11-17
Payer: COMMERCIAL

## 2023-11-17 DIAGNOSIS — R53.83 OTHER FATIGUE: ICD-10-CM

## 2023-11-17 LAB
VIT B12 SERPL-MCNC: 358 PG/ML (ref 232–1245)
VIT D+METAB SERPL-MCNC: 24 NG/ML (ref 20–50)

## 2023-11-17 PROCEDURE — 82607 VITAMIN B-12: CPT | Mod: ORL | Performed by: FAMILY MEDICINE

## 2023-11-17 PROCEDURE — 82306 VITAMIN D 25 HYDROXY: CPT | Mod: ORL | Performed by: FAMILY MEDICINE

## 2023-11-21 ENCOUNTER — VIRTUAL VISIT (OUTPATIENT)
Dept: PHARMACY | Facility: PHYSICIAN GROUP | Age: 73
End: 2023-11-21
Payer: COMMERCIAL

## 2023-11-21 DIAGNOSIS — R52 PAIN: Primary | ICD-10-CM

## 2023-11-21 DIAGNOSIS — M81.0 AGE-RELATED OSTEOPOROSIS WITHOUT CURRENT PATHOLOGICAL FRACTURE: ICD-10-CM

## 2023-11-21 PROCEDURE — 99606 MTMS BY PHARM EST 15 MIN: CPT | Performed by: PHARMACIST

## 2023-11-21 RX ORDER — LANOLIN ALCOHOL/MO/W.PET/CERES
1 CREAM (GRAM) TOPICAL 2 TIMES DAILY
COMMUNITY

## 2023-11-21 RX ORDER — IBANDRONATE SODIUM 150 MG/1
150 TABLET, FILM COATED ORAL
COMMUNITY

## 2023-11-21 NOTE — PATIENT INSTRUCTIONS
Recommendations from today's MT visit:                                                      1.  We again discussed there is a good chance the gabapentin is making you drowsy during the day. Since the pain levels have improved, talk with ortho at your appointment later today about further reducing and/or getting off of the gabapentin.     2. Your Vitamin D and B12 levels were in the low-normal range. You have started a Vitamin D and a calcium+D supplement for your bones which will also help to improve your Vitamin D levels. Let's hold off on starting B12  supplement to see if fatigue improves/resolves off of gabapentin and with improved Vitamin D levels.       Next appointment      Jan 26, 2024  9:30 AM  [facility] with Norah Olsen [facility] ([facility] ) [address] [place] [address] [phone]           Norah Olsen, Pharm.D., BCACP  Medication Therapy Management Pharmacist  [phone]    It was great to speak with you today.  I value your experience and would be very thankful for your time with providing feedback on our clinic survey. You may receive a survey via email or text message in the next few days.     To schedule another [facility] appointment, please call the clinic directly or you may call the scheduling line at [phone].    My Clinical Pharmacist's contact information:                                                      Please feel free to contact me with any questions or concerns you have.      Norah Olsen, Pharm.D., BCACP  Medication Therapy Management Pharmacist  [phone]

## 2023-11-21 NOTE — PROGRESS NOTES
Medication Therapy Management (MTM) Encounter    ASSESSMENT:                            Medication Adherence/Access: No issues identified    Osteoporosis:   Stable. Vitamin D and B12 levels low normal. Vitamin D supplement and calcium is indicated for treatment of osteoporosis. Will hold off on starting a B12 supplement at this time; I think it is more likely the fatigue she is experiencing is a side effect of the gabapentin.    Back pain:  Resolved postop.  As above, gabapentin may be contributing to daytime fatigue and she may benefit from beginning to taper off and monitoring pain levels.        PLAN:                            1.  We again discussed there is a good chance the gabapentin is making you drowsy during the day. Since the pain levels have improved, talk with ortho at your appointment later today about further reducing and/or getting off of the gabapentin.     2. Your Vitamin D and B12 levels were in the low-normal range. You have started a Vitamin D and a calcium+D supplement for your bones which will also help to improve your Vitamin D levels. Let's hold off on starting B12  supplement to see if fatigue improves/resolves off of gabapentin and with improved Vitamin D levels.       Next appointment      Jan 26, 2024  9:30 AM  [facility] with Norah Olsen [facility] ([facility] ) [address] [place] [address] [phone]           Norah Olsen, Pharm.D., [facility]  Medication Therapy Management Pharmacist  [phone]    SUBJECTIVE/OBJECTIVE:                          Lesly Lazaro is a 73 year old female called for an follow-up visit from 11/8/2023.      Reason for visit: Call to follow-up on recent Vitamin D and b12 labs.    Allergies/ADRs: Tetanus, Procaine  Past Medical History: Reviewed in chart  Tobacco: She reports that she quit smoking about 2 years ago. Her smoking use included cigarettes. She has  never used smokeless tobacco.  Alcohol: 1-3 beverages / week    Medication Adherence/Access: no issues reported. Has a chart at home to help her stay compliant    Osteoporosis:   ibandronate (Boniva) 150mg monthly (has been on current therapy 3 week - started 11/1/23)  Vitamin D3 1000IU (25mcg) daily - started after our visit last week  Calcium 600mg + Vitamin D 10mcg twice daily - started after visit last week  Patient is not experiencing side effects.  DEXA History: 6/29/2020 showed osteoporosis of the lumbar spine, right hip and osteopenia of the left hip  Risk factors: post-menopausal  Last vitamin D level:  Lab Results   Component Value Date    VITDT 24 11/17/2023         Back pain:  Gone following surgery  Gabapentin 300mg four times daily - has reduced on her own to three times daily, actually reports she hasn't been taking it very often over the last week. No worsening to pain, since this is resolved now. She notes feeling tired during the day for the last few weeks or months.  She often feels sleepy while sitting or at rest and even in conversation with others.     Today's Vitals: There were no vitals taken for this visit.  ----------------      I spent 15 minutes with this patient today. All changes were made via collaborative practice agreement with No primary care provider on file.. A copy of the visit note was provided to the patient's provider(s).    A summary of these recommendations was given to the patient.    Norah Olsen, Pharm.D., [facility]  Medication Therapy Management Pharmacist  [phone]     Medication Therapy Recommendations  No medication therapy recommendations to display

## 2024-01-26 ENCOUNTER — TRANSFERRED RECORDS (OUTPATIENT)
Dept: HEALTH INFORMATION MANAGEMENT | Facility: CLINIC | Age: 74
End: 2024-01-26
Payer: COMMERCIAL

## 2024-01-26 ENCOUNTER — OFFICE VISIT (OUTPATIENT)
Dept: PHARMACY | Facility: PHYSICIAN GROUP | Age: 74
End: 2024-01-26
Payer: COMMERCIAL

## 2024-01-26 VITALS
SYSTOLIC BLOOD PRESSURE: 110 MMHG | HEART RATE: 80 BPM | DIASTOLIC BLOOD PRESSURE: 79 MMHG | WEIGHT: 150.2 LBS | HEIGHT: 64 IN | BODY MASS INDEX: 25.64 KG/M2 | OXYGEN SATURATION: 94 %

## 2024-01-26 DIAGNOSIS — E11.9 TYPE 2 DIABETES MELLITUS WITHOUT COMPLICATION, WITHOUT LONG-TERM CURRENT USE OF INSULIN (H): ICD-10-CM

## 2024-01-26 DIAGNOSIS — E78.5 HYPERLIPIDEMIA, UNSPECIFIED HYPERLIPIDEMIA TYPE: Primary | ICD-10-CM

## 2024-01-26 DIAGNOSIS — I48.91 ATRIAL FIBRILLATION, UNSPECIFIED TYPE (H): ICD-10-CM

## 2024-01-26 DIAGNOSIS — G25.0 ESSENTIAL TREMOR: ICD-10-CM

## 2024-01-26 DIAGNOSIS — J44.9 CHRONIC OBSTRUCTIVE PULMONARY DISEASE, UNSPECIFIED COPD TYPE (H): ICD-10-CM

## 2024-01-26 DIAGNOSIS — M81.0 AGE-RELATED OSTEOPOROSIS WITHOUT CURRENT PATHOLOGICAL FRACTURE: ICD-10-CM

## 2024-01-26 DIAGNOSIS — R52 PAIN: ICD-10-CM

## 2024-01-26 DIAGNOSIS — I10 HYPERTENSION, ESSENTIAL: ICD-10-CM

## 2024-01-26 LAB — HBA1C MFR BLD: 5.7 % (ref 4.2–6.1)

## 2024-01-26 PROCEDURE — 99607 MTMS BY PHARM ADDL 15 MIN: CPT | Performed by: PHARMACIST

## 2024-01-26 PROCEDURE — 99605 MTMS BY PHARM NP 15 MIN: CPT | Performed by: PHARMACIST

## 2024-01-26 NOTE — PROGRESS NOTES
Medication Therapy Management (MTM) Encounter    ASSESSMENT:                            Medication Adherence/Access: No issues identified    Hyperlipidemia:   Stable    Diabetes:   Patient is meeting A1c goal of < 7%.  Self monitoring of blood glucose is at goal of WNL per patient report.      Afib/Hypertension:  Stable    Osteoporosis:   Stable    COPD:   Stable    Back pain:  Resolved    Tremor:   Unchanged. She may benefit from increasing primidone as recommended by neurology.    PLAN:                            1.  Increase the primidone to recommended doses between now and your upcoming neurology appointment -- we want to see if the higher dose helps the tremor with limited side effects.  Instructions: 2 tab morning and 1 tab at bedtime x 1 week, then increase to 2 tabs morning and 2 tabs at bedtime.   You did try propranolol in the past, and it isn't clear whether it was helpful or not      Appointments in Next Year      Jul 26, 2024  9:30 AM  Pharmacist Visit with Norah Olsen [facility] ([facility] ) [phone]              Norah Olsen, Pharm.D., [facility]  Medication Therapy Management Pharmacist  [phone]    SUBJECTIVE/OBJECTIVE:                          Lesly Lazaro is a 73 year old female coming in for an follow-up visit from 11/21/2023.      Reason for visit: Call to follow-up on recent Vitamin D and b12 labs.    Allergies/ADRs: Tetanus, Procaine  Past Medical History: Reviewed in chart  Tobacco: She reports that she quit smoking about 3 years ago. Her smoking use included cigarettes. She has never used smokeless tobacco.  Alcohol: 1-3 beverages / week    Medication Adherence/Access: no issues reported. Has a chart at home to help her stay compliant, uses a weekly pill box    Hyperlipidemia:   Atorvastatin 80mg daily at night  Patient reports no current medication side effects.  Lab Test 07/12/23 02/09/22   CHOL 193  "220*   HDL 64 53   * 135*   TRIG 112 158*     Type 2 Diabetes:    Ozempic 1mg injected once weekly - tolerating without issue; had not been able to tolerate 2mg dose  Patient reports no current medication side effects.  Blood sugar monitoring: occasionally; Ranges: (patient reported) 100's  Current diabetes symptoms: none  Eye exam: up to date - done in fall per patient  Foot exam: up to date  Urine Albumin:   Lab Results   Component Value Date    UMALCR 5.23 07/12/2023        Abstracted 01/26/24     Afib/Hypertension:  warfarin 6mg and aspirin 81mg for stroke prevention; we did discuss anticoagulant alternatives, but not interested in changing due to cost   Metoprolol succinate 50mg daily  Losartan 25mg daily  Hydrochlorothiazide 25mg daily   Patient reports no current medication side effects.    Does INR at home once per week, has been stable.  Patient does not have a history of GI bleed.   Patient self-monitors blood pressure.  Home BP monitoring usually normal..   BP Readings from Last 3 Encounters:   01/26/24 110/79   06/16/23 108/78   05/12/23 108/68     Pulse Readings from Last 3 Encounters:   01/26/24 80   05/12/23 80       Osteoporosis:   ibandronate (Boniva) 150mg monthly (started 11/1/23)  Vitamin D3 1000IU (25mcg) daily - started after our visit last week  Calcium 600mg + Vitamin D 10mcg twice daily  Patient is not experiencing side effects.  DEXA History: 6/29/2023 showed osteoporosis of the lumbar spine, right hip and osteopenia of the left hip  Risk factors: post-menopausal  Last vitamin D level:  Lab Results   Component Value Date    VITDT 24 11/17/2023         COPD:   Albuterol inhaler - hasn't used in \"ages\"  Duoneb PRN - also hasn't used at all recently  Patient reports no current medication side effects.    Patient reports the following symptoms: none.    Back pain:  Gone following surgery - feeling really good at this point  No longer taking gabapentin, fatigue levels are better  Has " "Acetaminophen on hand, but hasn't needed any  PT for her back and follows at home recommendations     Tremor:   Primidone 50mg 2 tablets BID - dose increased from 1 tab twice daily at neurology visit 6/30/23 and never did increase.  Stopping venlafaxine didn't make a difference to tremor, and mood is stable  Notices symptoms tend to be worse in the evening  She does report she has reduced caffeine  Medication History:  Propranolol 10mg twice daily - unclear why this was stopped, possibly was ineffective      Today's Vitals: /79   Pulse 80   Ht 5' 3.5\" (1.613 m)   Wt 150 lb 3.2 oz (68.1 kg)   SpO2 94%   BMI 26.19 kg/m    ----------------      I spent 60 minutes with this patient today. All changes were made via collaborative practice agreement with No primary care provider on file.. A copy of the visit note was provided to the patient's provider(s).    A summary of these recommendations was given to the patient.    Norah Olsen, Pharm.D., [facility]  Medication Therapy Management Pharmacist  [phone]     Medication Therapy Recommendations  No medication therapy recommendations to display         "

## 2024-01-26 NOTE — LETTER
_  Medication List        Prepared on: 01/26/2024     Bring your Medication List when you go to the doctor, hospital, or   emergency room. And, share it with your family or caregivers.     Note any changes to how you take your medications.  Cross out medications when you no longer use them.    Medication How I take it Why I use it Prescriber   acetaminophen (TYLENOL) 325 MG tablet Take 2 tablets by mouth every 4 hours as needed Pain Dell Franks MD   ALBUTEROL SULFATE HFA IN Inhale 2 Inhalations into the lungs every 4 hours as needed Acute Chronic Obstructive Pulmonary Disease Exacerbation.   -Start Solu-Medrol 60 mg IV daily  -Continue oxygen support and wean as tolerated  -Continue home medications  -Start duonebs q.4 hours while awake and every 2 hour as needed Dell Franks MD   aspirin 81 MG EC tablet Take 81 mg by mouth daily Disease involving Lipid Deposits in the Arteries Dell Franks MD   atorvastatin (LIPITOR) 80 MG tablet Take 80 mg by mouth daily Elevation of Both Cholesterol and Triglycerides in Blood Dell Franks MD   calcium citrate-vitamin D (CITRACAL) 315-5 MG-MCG TABS per tablet Take 1 tablet by mouth 2 times daily osteoporosis Dell Franks MD   cholecalciferol (VITAMIN D3) 25 mcg (1000 units) capsule Take 1 capsule by mouth daily Osteoporosis Dell Franks MD   hydrochlorothiazide (HYDRODIURIL) 25 MG tablet Take 25 mg by mouth daily High Blood Pressure Disorder Dell Franks MD   IBANdronate (BONIVA) 150 MG tablet Take 150 mg by mouth every 30 days Postmenopausal Osteoporosis Dell Franks MD   ipratropium - albuterol 0.5 mg/2.5 mg/3 mL (DUONEB) 0.5-2.5 (3) MG/3ML neb solution Take 1 vial by nebulization every 6 hours as needed for shortness of breath Asthma Dell Franks MD   LORazepam (ATIVAN) 0.5 MG tablet Take 0.5 mg by mouth 3 times daily as needed for anxiety Anxiety Dell Franks MD   losartan (COZAAR) 25 MG tablet Take 25 mg by mouth daily High Blood  Pressure Disorder Dell Franks MD   metoprolol succinate ER (TOPROL XL) 50 MG 24 hr tablet Take 50 mg by mouth daily Atrial Fibrillation; High Blood Pressure Disorder Dell Franks MD   primidone (MYSOLINE) 50 MG tablet Take 50 mg by mouth 2 times daily Fine to Coarse Slow Tremor Affecting Head, Hands & Voice Dell Franks MD   Semaglutide, 1 MG/DOSE, (OZEMPIC, 1 MG/DOSE,) 4 MG/3ML pen Inject 1 mg Subcutaneous every 7 days Type 2 Diabetes Dell Franks MD   warfarin ANTICOAGULANT (COUMADIN) 7.5 MG tablet Take 7.5 mg by mouth As directed per PCP Atrial Fibrillation Dell Franks MD         Add new medications, over-the-counter drugs, herbals, vitamins, or  minerals in the blank rows below.    Medication How I take it Why I use it Prescriber                                      Allergies:      No Active Allergies        Side effects I have had:               Other Information:              My notes and questions:

## 2024-01-26 NOTE — LETTER
"Recommended To-Do List      Prepared on: 01/26/2024       You can get the best results from your medications by completing the items on this \"To-Do List.\"      Bring your To-Do List when you go to your doctor. And, share it with your family or caregivers.    My To-Do List:  What we talked about: What I should do:     Medication dose too low   Increase the primidone to recommended doses between now and your upcoming neurology appointment -- we want to see if the higher dose helps the tremor with limited side effects.  Instructions: 2 tab morning and 1 tab at bedtime x 1 week, then increase to 2 tabs morning and 2 tabs at bedtime.   You did try propranolol in the past, and it isn't clear whether it was helpful or not          What we talked about: What I should do:                     "

## 2024-01-26 NOTE — LETTER
January 26, 2024  Lesly Lazaro  [address] [place] [address]    Dear Ms. Lazaro, [facility]     Thank you for talking with me on Jan 26, 2024 about your health and medications. As a follow-up to our conversation, I have included two documents:      Your Recommended To-Do List has steps you should take to get the best results from your medications.  Your Medication List will help you keep track of your medications and how to take them.    If you want to talk about these documents, please call Norah Olsen RPH, PharmD, BCACP  at phone: [phone], Monday-Friday 8-4:30pm.    I look forward to working with you and your doctors to make sure your medications work well for you.    Sincerely,  Norah Olsen RPH, PharmD, BCACP   [facility] Pharmacist, [facility]

## 2024-01-26 NOTE — PATIENT INSTRUCTIONS
Recommendations from today's MT visit:                                                      1.  Increase the primidone to recommended doses between now and your upcoming neurology appointment -- we want to see if the higher dose helps the tremor with limited side effects.  Instructions: 2 tab morning and 1 tab at bedtime x 1 week, then increase to 2 tabs morning and 2 tabs at bedtime.   You did try propranolol in the past, and it isn't clear whether it was helpful or not      Follow-up  Jul 26, 2024  9:30 AM  Pharmacist Visit with Norah Olsen [facility] ([facility] ) [phone]       Norah Olsen Pharm.D., LUCYCP  Medication Therapy Management Pharmacist  [phone]    It was great to speak with you today.  I value your experience and would be very thankful for your time with providing feedback on our clinic survey. You may receive a survey via email or text message in the next few days.     To schedule another MT appointment, please call the clinic directly or you may call the scheduling line at [phone].    My Clinical Pharmacist's contact information:                                                      Please feel free to contact me with any questions or concerns you have.      Norah Olsen, PharmLUISANA., LUCYCP  Medication Therapy Management Pharmacist  [phone]

## 2024-05-13 ENCOUNTER — LAB REQUISITION (OUTPATIENT)
Dept: LAB | Facility: CLINIC | Age: 74
End: 2024-05-13
Payer: COMMERCIAL

## 2024-05-13 ENCOUNTER — TRANSFERRED RECORDS (OUTPATIENT)
Dept: HEALTH INFORMATION MANAGEMENT | Facility: CLINIC | Age: 74
End: 2024-05-13

## 2024-05-13 DIAGNOSIS — I11.9 HYPERTENSIVE HEART DISEASE WITHOUT HEART FAILURE: ICD-10-CM

## 2024-05-13 DIAGNOSIS — M81.0 AGE-RELATED OSTEOPOROSIS WITHOUT CURRENT PATHOLOGICAL FRACTURE: ICD-10-CM

## 2024-05-13 DIAGNOSIS — E11.9 TYPE 2 DIABETES MELLITUS WITHOUT COMPLICATIONS (H): ICD-10-CM

## 2024-05-13 LAB
ALBUMIN SERPL BCG-MCNC: 4 G/DL (ref 3.5–5.2)
ALP SERPL-CCNC: 88 U/L (ref 40–150)
ALT SERPL W P-5'-P-CCNC: 22 U/L (ref 0–50)
ANION GAP SERPL CALCULATED.3IONS-SCNC: 13 MMOL/L (ref 7–15)
AST SERPL W P-5'-P-CCNC: 36 U/L (ref 0–45)
BILIRUB SERPL-MCNC: 0.3 MG/DL
BUN SERPL-MCNC: 15.8 MG/DL (ref 8–23)
CALCIUM SERPL-MCNC: 10 MG/DL (ref 8.8–10.2)
CHLORIDE SERPL-SCNC: 98 MMOL/L (ref 98–107)
CHOLEST SERPL-MCNC: 177 MG/DL
CREAT SERPL-MCNC: 0.81 MG/DL (ref 0.51–0.95)
CREAT UR-MCNC: 81.8 MG/DL
DEPRECATED HCO3 PLAS-SCNC: 27 MMOL/L (ref 22–29)
EGFRCR SERPLBLD CKD-EPI 2021: 77 ML/MIN/1.73M2
FASTING STATUS PATIENT QL REPORTED: NORMAL
FASTING STATUS PATIENT QL REPORTED: NORMAL
GLUCOSE SERPL-MCNC: 92 MG/DL (ref 70–99)
HBA1C MFR BLD: 5.5 % (ref 4.2–6.1)
HDLC SERPL-MCNC: 71 MG/DL
LDLC SERPL CALC-MCNC: 89 MG/DL
MICROALBUMIN UR-MCNC: <12 MG/L
MICROALBUMIN/CREAT UR: NORMAL MG/G{CREAT}
NONHDLC SERPL-MCNC: 106 MG/DL
POTASSIUM SERPL-SCNC: 3.4 MMOL/L (ref 3.4–5.3)
PROT SERPL-MCNC: 7.9 G/DL (ref 6.4–8.3)
SODIUM SERPL-SCNC: 138 MMOL/L (ref 135–145)
TRIGL SERPL-MCNC: 83 MG/DL
TSH SERPL DL<=0.005 MIU/L-ACNC: 1.1 UIU/ML (ref 0.3–4.2)
VIT D+METAB SERPL-MCNC: 40 NG/ML (ref 20–50)

## 2024-05-13 PROCEDURE — 82306 VITAMIN D 25 HYDROXY: CPT | Mod: ORL | Performed by: FAMILY MEDICINE

## 2024-05-13 PROCEDURE — 84443 ASSAY THYROID STIM HORMONE: CPT | Mod: ORL | Performed by: FAMILY MEDICINE

## 2024-05-13 PROCEDURE — 80061 LIPID PANEL: CPT | Mod: ORL | Performed by: FAMILY MEDICINE

## 2024-05-13 PROCEDURE — 80053 COMPREHEN METABOLIC PANEL: CPT | Mod: ORL | Performed by: FAMILY MEDICINE

## 2024-05-13 PROCEDURE — 82043 UR ALBUMIN QUANTITATIVE: CPT | Mod: ORL | Performed by: FAMILY MEDICINE

## 2024-07-25 NOTE — PROGRESS NOTES
Medication Therapy Management (MTM) Encounter    ASSESSMENT:                            Medication Adherence/Access: No issues identified    Hyperlipidemia:   Stable on high dose statin    Diabetes:   Patient is meeting A1c goal of < 7%.  Needs to decide if Ozempic cost is sustainable to continue -- if so, will likely need to re-titrate from starting dose. Otherwise, may consider re-trial of metformin.    Afib/Hypertension:  Stable    Tremor:   Stable    PLAN:                            1. We talked about options for diabetes medications, which include:    - paying the copay for Ozempic to stay on it -- if and when you hit Stage 4 of your medicare coverage, the cost will go down again **IF you decide to stay on Ozempic, then I will give you a sample of Ozempic to get you restarted at the lower dose and work back up to your normal dose of 1mg**    - considering alternate medication, like going back on metformin    Follow-up: Call me and let me know how you would like to proceed with the medications.      SUBJECTIVE/OBJECTIVE:                          Lesly Lazaro is a 74 year old female seen for an follow-up visit.      Reason for visit: Medication review    Allergies/ADRs: Tetanus, Procaine  Past Medical History: Reviewed in chart  Tobacco: She reports that she quit smoking about 3 years ago. Her smoking use included cigarettes. She has never used smokeless tobacco.  Alcohol: 1-3 beverages / week    Medication Adherence/Access: issues reported - was denied PAP for Ozempic this year. She is currently in Stage 3 (donut hole) of Medicare coverage. Has a chart at home to help her stay compliant, uses a weekly pill box    Hyperlipidemia:   Atorvastatin 80mg daily at night  Patient reports no current medication side effects.  Recent Labs   Lab Test 05/13/24  1042 07/12/23  0859   CHOL 177 193   HDL 71 64   LDL 89 107*   TRIG 83 112       Type 2 Diabetes:    Ozempic 1mg injected once weekly - tolerating without issue;  had not been able to tolerate 2mg dose  Patient reports no current medication side effects.  Has been off completely for almost 1 month. We re-applied for PAP through [employer] earlier this month and her application was denied, based on income. Her  Lei was approved, but his application was a re-newal from last year vs a new application for this year. Reports her copay was around $200/month, which she could afford if necessary, but was hoping to qualify for PAP again. We discussed alternatives like compounded semaglutide would be in the same price range, or we could consider an alternative medication. She is really happy with how the Ozempic has worked for her to control blood sugar and with weight loss, prefers to stay on it if she can. She will discuss the cost with her .    Blood sugar monitoring: occasionally; Ranges: (patient reported) 100's  Current diabetes symptoms: none  Urine Albumin:   Lab Results   Component Value Date    UMALCR  05/13/2024      Comment:      Unable to calculate, urine albumin and/or urine creatinine is outside detectable limits.  Microalbuminuria is defined as an albumin:creatinine ratio of 17 to 299 for males and 25 to 299 for females. A ratio of albumin:creatinine of 300 or higher is indicative of overt proteinuria.  Due to biologic variability, positive results should be confirmed by a second, first-morning random or 24-hour timed urine specimen. If there is discrepancy, a third specimen is recommended. When 2 out of 3 results are in the microalbuminuria range, this is evidence for incipient nephropathy and warrants increased efforts at glucose control, blood pressure control, and institution of therapy with an angiotensin-converting-enzyme (ACE) inhibitor (if the patient can tolerate it).          Abstracted 07/25/24     Afib/Hypertension:  warfarin 6mg and aspirin 81mg for stroke prevention; we did discuss anticoagulant alternatives, but not interested in changing  due to cost  Metoprolol succinate 50mg daily  Losartan 25mg daily  Hydrochlorothiazide 25mg daily   Patient reports no current medication side effects.    Does INR at home once per week, was 2.2 yesterday  Patient does not have a history of GI bleed.   Patient self-monitors blood pressure.  Home BP monitoring usually normal..   BP Readings from Last 3 Encounters:   01/26/24 110/79   06/16/23 108/78   05/12/23 108/68     Pulse Readings from Last 3 Encounters:   01/26/24 80   05/12/23 80       Tremor:   Primidone 50mg 2 tablets BID - follows with neurology  Stopping venlafaxine didn't make a difference to tremor, and mood is stable  Notices symptoms tend to be worse in the evening  She does report she has reduced caffeine  Medication History:  Propranolol 10mg twice daily - unclear why this was stopped, possibly was ineffective      Today's Vitals: Wt 143 lb (64.9 kg)   BMI 24.93 kg/m    ----------------      I spent 30 minutes with this patient today. All changes were made via collaborative practice agreement with No primary care provider on file.. A copy of the visit note was provided to the patient's provider(s).    A summary of these recommendations was given to the patient.    Norah Olsen, Pharm.D., [facility]  Medication Therapy Management Pharmacist  [phone]     Medication Therapy Recommendations  Type 2 diabetes mellitus without complication, without long-term current use of insulin (H)    Current Medication: Semaglutide, 1 MG/DOSE, (OZEMPIC, 1 MG/DOSE,) 4 MG/3ML pen   Rationale: Cannot afford medication product - Cost - Adherence   Recommendation: Provide Education   Status: Patient Agreed - Adherence/Education

## 2024-07-26 ENCOUNTER — OFFICE VISIT (OUTPATIENT)
Dept: PHARMACY | Facility: PHYSICIAN GROUP | Age: 74
End: 2024-07-26
Payer: COMMERCIAL

## 2024-07-26 VITALS — WEIGHT: 143 LBS | BODY MASS INDEX: 24.93 KG/M2

## 2024-07-26 DIAGNOSIS — E78.5 HYPERLIPIDEMIA, UNSPECIFIED HYPERLIPIDEMIA TYPE: Primary | ICD-10-CM

## 2024-07-26 DIAGNOSIS — E11.9 TYPE 2 DIABETES MELLITUS WITHOUT COMPLICATION, WITHOUT LONG-TERM CURRENT USE OF INSULIN (H): ICD-10-CM

## 2024-07-26 DIAGNOSIS — I48.91 ATRIAL FIBRILLATION, UNSPECIFIED TYPE (H): ICD-10-CM

## 2024-07-26 DIAGNOSIS — G25.0 ESSENTIAL TREMOR: ICD-10-CM

## 2024-07-26 DIAGNOSIS — I10 HYPERTENSION, ESSENTIAL: ICD-10-CM

## 2024-07-26 PROCEDURE — 99606 MTMS BY PHARM EST 15 MIN: CPT | Performed by: PHARMACIST

## 2024-07-26 PROCEDURE — 99607 MTMS BY PHARM ADDL 15 MIN: CPT | Performed by: PHARMACIST

## 2024-07-26 NOTE — PATIENT INSTRUCTIONS
Recommendations from today's MTM visit:                                                      1. We talked about options for diabetes medications, which include:    - paying the copay for Ozempic to stay on it -- if and when you hit Stage 4 of your medicare coverage, the cost will go down again **IF you decide to stay on Ozempic, then I will give you a sample of Ozempic to get you restarted at the lower dose and work back up to your normal dose of 1mg**    - considering alternate medication, like going back on metformin    Follow-up: Call me and let me know how you would like to proceed with the medications.    It was great to speak with you today.  I value your experience and would be very thankful for your time with providing feedback on our clinic survey. You may receive a survey via email or text message in the next few days.     To schedule another MTM appointment, please call the clinic directly or you may call the scheduling line at [phone].    My Clinical Pharmacist's contact information:                                                      Please feel free to contact me with any questions or concerns you have.      Norah Olsen, Pharm.D., BCACP  Medication Therapy Management Pharmacist  [phone]

## 2024-11-11 ENCOUNTER — TRANSFERRED RECORDS (OUTPATIENT)
Dept: HEALTH INFORMATION MANAGEMENT | Facility: CLINIC | Age: 74
End: 2024-11-11
Payer: COMMERCIAL

## 2025-06-09 ENCOUNTER — LAB REQUISITION (OUTPATIENT)
Dept: LAB | Facility: CLINIC | Age: 75
End: 2025-06-09
Payer: COMMERCIAL

## 2025-06-09 DIAGNOSIS — M81.0 AGE-RELATED OSTEOPOROSIS WITHOUT CURRENT PATHOLOGICAL FRACTURE: ICD-10-CM

## 2025-06-09 DIAGNOSIS — E11.9 TYPE 2 DIABETES MELLITUS WITHOUT COMPLICATIONS (H): ICD-10-CM

## 2025-06-09 DIAGNOSIS — I11.9 HYPERTENSIVE HEART DISEASE WITHOUT HEART FAILURE: ICD-10-CM

## 2025-06-09 LAB
ALBUMIN SERPL BCG-MCNC: 3.8 G/DL (ref 3.5–5.2)
ALP SERPL-CCNC: 80 U/L (ref 40–150)
ALT SERPL W P-5'-P-CCNC: 26 U/L (ref 0–50)
ANION GAP SERPL CALCULATED.3IONS-SCNC: 9 MMOL/L (ref 7–15)
AST SERPL W P-5'-P-CCNC: 38 U/L (ref 0–45)
BILIRUB SERPL-MCNC: 0.2 MG/DL
BUN SERPL-MCNC: 14.9 MG/DL (ref 8–23)
CALCIUM SERPL-MCNC: 9.9 MG/DL (ref 8.8–10.4)
CHLORIDE SERPL-SCNC: 99 MMOL/L (ref 98–107)
CHOLEST SERPL-MCNC: 181 MG/DL
CREAT SERPL-MCNC: 0.82 MG/DL (ref 0.51–0.95)
CREAT UR-MCNC: 222 MG/DL
EGFRCR SERPLBLD CKD-EPI 2021: 75 ML/MIN/1.73M2
FASTING STATUS PATIENT QL REPORTED: YES
FASTING STATUS PATIENT QL REPORTED: YES
GLUCOSE SERPL-MCNC: 91 MG/DL (ref 70–99)
HCO3 SERPL-SCNC: 30 MMOL/L (ref 22–29)
HDLC SERPL-MCNC: 72 MG/DL
LDLC SERPL CALC-MCNC: 79 MG/DL
MICROALBUMIN UR-MCNC: <12 MG/L
MICROALBUMIN/CREAT UR: NORMAL MG/G{CREAT}
NONHDLC SERPL-MCNC: 109 MG/DL
POTASSIUM SERPL-SCNC: 4.5 MMOL/L (ref 3.4–5.3)
PROT SERPL-MCNC: 7.5 G/DL (ref 6.4–8.3)
SODIUM SERPL-SCNC: 138 MMOL/L (ref 135–145)
TRIGL SERPL-MCNC: 149 MG/DL
TSH SERPL DL<=0.005 MIU/L-ACNC: 2.09 UIU/ML (ref 0.3–4.2)
VIT D+METAB SERPL-MCNC: 38 NG/ML (ref 20–50)

## 2025-06-09 PROCEDURE — 82570 ASSAY OF URINE CREATININE: CPT | Mod: ORL | Performed by: FAMILY MEDICINE

## 2025-06-09 PROCEDURE — 82306 VITAMIN D 25 HYDROXY: CPT | Mod: ORL | Performed by: FAMILY MEDICINE

## 2025-06-09 PROCEDURE — 80053 COMPREHEN METABOLIC PANEL: CPT | Mod: ORL | Performed by: FAMILY MEDICINE

## 2025-06-09 PROCEDURE — 80061 LIPID PANEL: CPT | Mod: ORL | Performed by: FAMILY MEDICINE

## 2025-06-09 PROCEDURE — 84443 ASSAY THYROID STIM HORMONE: CPT | Mod: ORL | Performed by: FAMILY MEDICINE

## 2025-09-03 ENCOUNTER — TELEPHONE (OUTPATIENT)
Dept: PHARMACY | Facility: PHYSICIAN GROUP | Age: 75
End: 2025-09-03
Payer: COMMERCIAL